# Patient Record
Sex: FEMALE | Race: WHITE | Employment: UNEMPLOYED | URBAN - METROPOLITAN AREA
[De-identification: names, ages, dates, MRNs, and addresses within clinical notes are randomized per-mention and may not be internally consistent; named-entity substitution may affect disease eponyms.]

---

## 2021-03-17 ENCOUNTER — HOSPITAL ENCOUNTER (OUTPATIENT)
Dept: LAB | Age: 66
Discharge: HOME | End: 2021-03-17
Payer: OTHER GOVERNMENT

## 2021-03-17 ENCOUNTER — OFFICE VISIT (OUTPATIENT)
Dept: HEMATOLOGY | Age: 66
End: 2021-03-17
Payer: OTHER GOVERNMENT

## 2021-03-17 VITALS
SYSTOLIC BLOOD PRESSURE: 128 MMHG | HEIGHT: 64 IN | WEIGHT: 192.13 LBS | BODY MASS INDEX: 32.8 KG/M2 | DIASTOLIC BLOOD PRESSURE: 75 MMHG | TEMPERATURE: 98.3 F | OXYGEN SATURATION: 98 % | HEART RATE: 68 BPM

## 2021-03-17 DIAGNOSIS — R74.8 ELEVATED LIVER ENZYMES: Primary | ICD-10-CM

## 2021-03-17 DIAGNOSIS — R74.8 ELEVATED LIVER ENZYMES: ICD-10-CM

## 2021-03-17 PROBLEM — I10 HYPERTENSION: Status: ACTIVE | Noted: 2021-03-17

## 2021-03-17 PROBLEM — Z98.84 HISTORY OF GASTRIC BYPASS: Status: ACTIVE | Noted: 2021-03-17

## 2021-03-17 PROBLEM — E78.00 HYPERCHOLESTEREMIA: Status: ACTIVE | Noted: 2021-03-17

## 2021-03-17 PROBLEM — C67.9 BLADDER CANCER (HCC): Status: ACTIVE | Noted: 2021-03-17

## 2021-03-17 LAB
ALBUMIN SERPL-MCNC: 3.9 G/DL (ref 3.4–5)
ALBUMIN/GLOB SERPL: 1.1 {RATIO} (ref 0.8–1.7)
ALP SERPL-CCNC: 155 U/L (ref 45–117)
ALT SERPL-CCNC: 36 U/L (ref 13–56)
ANION GAP SERPL CALC-SCNC: 4 MMOL/L (ref 3–18)
AST SERPL-CCNC: 37 U/L (ref 10–38)
BASOPHILS # BLD: 0 K/UL (ref 0–0.1)
BASOPHILS NFR BLD: 1 % (ref 0–2)
BILIRUB DIRECT SERPL-MCNC: 0.1 MG/DL (ref 0–0.2)
BILIRUB SERPL-MCNC: 0.3 MG/DL (ref 0.2–1)
BUN SERPL-MCNC: 15 MG/DL (ref 7–18)
BUN/CREAT SERPL: 19 (ref 12–20)
CALCIUM SERPL-MCNC: 9.5 MG/DL (ref 8.5–10.1)
CHLORIDE SERPL-SCNC: 107 MMOL/L (ref 100–111)
CO2 SERPL-SCNC: 29 MMOL/L (ref 21–32)
CREAT SERPL-MCNC: 0.77 MG/DL (ref 0.6–1.3)
DIFFERENTIAL METHOD BLD: ABNORMAL
EOSINOPHIL # BLD: 0.2 K/UL (ref 0–0.4)
EOSINOPHIL NFR BLD: 3 % (ref 0–5)
ERYTHROCYTE [DISTWIDTH] IN BLOOD BY AUTOMATED COUNT: 12.6 % (ref 11.6–14.5)
GLOBULIN SER CALC-MCNC: 3.4 G/DL (ref 2–4)
GLUCOSE SERPL-MCNC: 87 MG/DL (ref 74–99)
HCT VFR BLD AUTO: 36.8 % (ref 35–45)
HGB BLD-MCNC: 11.8 G/DL (ref 12–16)
LYMPHOCYTES # BLD: 1.5 K/UL (ref 0.9–3.6)
LYMPHOCYTES NFR BLD: 30 % (ref 21–52)
MCH RBC QN AUTO: 33.9 PG (ref 24–34)
MCHC RBC AUTO-ENTMCNC: 32.1 G/DL (ref 31–37)
MCV RBC AUTO: 105.7 FL (ref 74–97)
MONOCYTES # BLD: 0.4 K/UL (ref 0.05–1.2)
MONOCYTES NFR BLD: 9 % (ref 3–10)
NEUTS SEG # BLD: 2.9 K/UL (ref 1.8–8)
NEUTS SEG NFR BLD: 57 % (ref 40–73)
PLATELET # BLD AUTO: 235 K/UL (ref 135–420)
PMV BLD AUTO: 11.3 FL (ref 9.2–11.8)
POTASSIUM SERPL-SCNC: 4 MMOL/L (ref 3.5–5.5)
PROT SERPL-MCNC: 7.3 G/DL (ref 6.4–8.2)
RBC # BLD AUTO: 3.48 M/UL (ref 4.2–5.3)
SODIUM SERPL-SCNC: 140 MMOL/L (ref 136–145)
WBC # BLD AUTO: 5.1 K/UL (ref 4.6–13.2)

## 2021-03-17 PROCEDURE — 86706 HEP B SURFACE ANTIBODY: CPT

## 2021-03-17 PROCEDURE — 80076 HEPATIC FUNCTION PANEL: CPT

## 2021-03-17 PROCEDURE — 36415 COLL VENOUS BLD VENIPUNCTURE: CPT

## 2021-03-17 PROCEDURE — 99204 OFFICE O/P NEW MOD 45 MIN: CPT | Performed by: NURSE PRACTITIONER

## 2021-03-17 PROCEDURE — 86708 HEPATITIS A ANTIBODY: CPT

## 2021-03-17 PROCEDURE — 85025 COMPLETE CBC W/AUTO DIFF WBC: CPT

## 2021-03-17 PROCEDURE — 80048 BASIC METABOLIC PNL TOTAL CA: CPT

## 2021-03-17 PROCEDURE — 87340 HEPATITIS B SURFACE AG IA: CPT

## 2021-03-17 PROCEDURE — 86803 HEPATITIS C AB TEST: CPT

## 2021-03-17 PROCEDURE — 86704 HEP B CORE ANTIBODY TOTAL: CPT

## 2021-03-17 NOTE — PROGRESS NOTES
[address] [facility]      Nolan Cushing, MD, Scarlet Guerrero, Morrison Fabry, MD, MPH      Bridgett Ayon, PA-VITO Sakrar, ACNP-BC     Coleen Trejo, [facility]   Deepa Faria FNP-C    Simona Morgan, [facility]       Michelle Covarrubias On license of [facility] 136    at [address], [address] [place] [address], [facility] 22.    [phone]    FAX: [address] [place] [address], [address]    [phone]    FAX: [phone]       Patient Care Team:  Evelyn Arce DO as PCP - General (Family Medicine)  Gaurav Jerome NP as Consulting Provider (Nurse Practitioner)    Problem List  Date Reviewed: 3/17/2021          Codes Class Noted    Bladder cancer [facility]) ICD-10-CM: C67.9  ICD-9-CM: 188.9  3/17/2021        History of gastric bypass ICD-10-CM: Z98.84  ICD-9-CM: V45.86  3/17/2021        Hypertension ICD-10-CM: I10  ICD-9-CM: 401.9  3/17/2021        Hypercholesteremia ICD-10-CM: E78.00  ICD-9-CM: 272.0  3/17/2021        Elevated liver enzymes ICD-10-CM: R74.8  ICD-9-CM: 790.5  3/17/2021            The clinicians listed above have asked me to see Shell Oris in consultation regarding elevated liver enzymes and its management. All medical records sent by the referring physicians were reviewed including imaging. The patient is a 63 y.o.  female who was found to have elevated liver enzymes in 1/2021. Serologic evaluation for markers of chronic liver disease has either not been performed or the     Imaging of the liver was either not performed or the results are not available to me. An assessment of liver fibrosis with biopsy or elastography has not been performed.       The patient had not started any new medications within 3 months     The patient has the following symptoms which are thought to be due to the liver disease:  pain in the right side over the liver. The patient is not currently experiencing the following symptoms of liver disease: fatigue, yellowing of the eyes or skin, problems concentrating. The patient completes all daily activities without any functional limitations. ASSESSMENT AND PLAN:  Elevated liver enzymes    Persistent elevation in liver transaminases of unclear etiology at this time. Serologic testing for causes of chronic liver disease was ordered. Will perform additional serologic tests to screen for other causes of chronic liver disease. The most likely causes for the liver chemistry abnormalities were discussed with the patient and include alcoholic liver diease. The need to perform an assessment of liver fibrosis was discussed with the patient. The Fibroscan can assess liver fibrosis and determine if a patient has advanced fibrosis or cirrhosis without the need for liver biopsy. This will be performed at the next office visit. If the Fibroscan suggests advanced fibrosis then a liver biopsy should be considered. The Fibroscan can be repeated annually or as often as clinically indicated to assess for fibrosis progression and/or regression. If the liver enzymes remain persistently elevated without explanation over 1-2 years then a liver biopsy should be considered. Will perform laboratory testing to monitor liver function and degree of liver injury. This included   BMP, hepatic panel, CBC with platelet count, INR. Will perform imaging of the liver with ultrasound. The need to perform a liver biopsy to help determine the cause and severity of the liver test abnormalities was discussed. The risks of performing the liver biopsy including pain, puncture of the lung, gallbladder, intestine or kidney and bleeding were discussed. Will defer liver biopsy for now.         Screening for Hepatocellular Carcinoma  HCC screening is not necessary if the patient has no evidence of cirrhosis. Treatment of other medical problems in patients with chronic liver disease  There are no contraindications for the patient to take most medications that are necessary for treatment of other medical issues. The patient can take any medications utilized for treatment of DM, statins to treat hypercholesterolemia    The patient consumes alcohol on a daily basis or has recently stopped consuming alcohol. Regular alcohol use increases the risk of toxicity from acetaminophen. This analgesic should be avoided until the patient has been abstinent from alcohol for 6 months. Counseling for alcohol in patients with chronic liver disease  The patient was counseled regarding alcohol consumption and the effect of alcohol on chronic liver disease. Patients who have undergone obesity surgery are at much greater risk to develop alcoholic liver injury. The patient consumes too much alcohol and is at risk to develop alcohol induced liver injury. It was recommended that all alcohol consumption be stopped and the patient be abstinent from alcohol  for at least 3 months. Vaccinations   The need for vaccination against viral hepatitis A and B will be assessed with serologic and instituted as appropriate. Routine vaccinations against other bacterial and viral agents can be performed as indicated. Annual flu vaccination should be administered if indicated. ALLERGIES  Allergies   Allergen Reactions    Lisinopril Other (comments)     Pt states makes her feel really loopy    Niacin Other (comments)     Flushing         MEDICATIONS  Current Outpatient Medications   Medication Sig    aspirin 81 mg chewable tablet Take 81 mg by mouth daily.  atorvastatin (LIPITOR) 40 mg tablet Take 40 mg by mouth daily.  telmisartan (MICARDIS) 40 mg tablet Take 40 mg by mouth daily.     diazepam (VALIUM) 5 mg tablet Take 1 Tab by mouth every eight (8) hours as needed for Anxiety. Max Daily Amount: 15 mg. No current facility-administered medications for this visit. SYSTEM REVIEW NOT RELATED TO LIVER DISEASE OR REVIEWED ABOVE:  Constitution systems: Negative for fever, chills, weight gain, weight loss. Eyes: Negative for visual changes. ENT: Negative for sore throat, painful swallowing. Respiratory: Negative for cough, hemoptysis, SOB. Cardiology: Negative for chest pain, palpitations. GI:  Negative for constipation or diarrhea. : Negative for urinary frequency, dysuria, hematuria, nocturia. Skin: Negative for rash. Hematology: Negative for easy bruising, blood clots. Musculo-skelatal: Negative for back pain, muscle pain, weakness. Neurologic: Negative for headaches, dizziness, vertigo, memory problems not related to HE. Psychology: Negative for anxiety, depression. FAMILY HISTORY:  The father  of complications of open heart surgery. The mother Has/had the following chronic disease(s): CAD, Stroke. There is no family history of liver disease. SOCIAL HISTORY:  The patient is . The patient has 2 children, 4 step children and 15 grandchildren. The patient stopped using tobacco products in . The patient has previously consumed alcohol in excess. The patient retired in  as a nurse. PHYSICAL EXAMINATION:  Visit Vitals  /75   Pulse 68   Temp 98.3 °F (36.8 °C) (Tympanic)   Ht 5' 4\" (1.626 m)   Wt 192 lb 2 oz (87.1 kg)   SpO2 98%   BMI 32.98 kg/m²     General: No acute distress. Eyes: Sclera anicteric. ENT: No oral lesions. Thyroid normal.  Nodes: No adenopathy. Skin: No spider angiomata. No jaundice. No palmar erythema. Respiratory: Lungs clear to auscultation. Cardiovascular: Regular heart rate. No murmurs. No JVD. Abdomen: Soft non-tender. Liver size normal to percussion/palpation. Spleen not palpable. No obvious ascites. Extremities: No edema.   No muscle wasting. No gross arthritic changes. Neurologic: Alert and oriented. Cranial nerves grossly intact. No asterixis. LABORATORY STUDIES:  Recent liver function panel, CBC with platelet count and BMP are not available. These studies will be performed. SEROLOGIES:  Not available or performed. Testing was performed today. LIVER HISTOLOGY:  Not available or performed    ENDOSCOPIC PROCEDURES:  Not available or performed    RADIOLOGY:  Not available or performed    OTHER TESTING:  Not available or performed    FOLLOW-UP:  All of the issues listed above in the Assessment and Plan were discussed with the patient. All questions were answered. The patient expressed a clear understanding of the above. [address] [place] [address] in 4 weeks for Fibroscan and to review all data and determine the treatment plan.         Geronimo Mancera, AGPCNP-BC  [address] Liver [place] [address] [address]  [address] [place] [address], [address] [place] [address] Street [address]  [phone]

## 2021-03-18 LAB
HAV AB SER QL IA: NEGATIVE
HBV CORE AB SERPL QL IA: NEGATIVE
HBV SURFACE AB SER QL IA: POSITIVE
HBV SURFACE AB SERPL IA-ACNC: 13.09 MIU/ML
HBV SURFACE AG SER QL: <0.1 INDEX
HBV SURFACE AG SER QL: NEGATIVE
HCV AB S/CO SERPL IA: <0.1 S/CO RATIO (ref 0–0.9)
HCV AB SERPL QL IA: NORMAL
HEP BS AB COMMENT,HBSAC: NORMAL

## 2021-04-19 ENCOUNTER — HOSPITAL ENCOUNTER (OUTPATIENT)
Dept: LAB | Age: 66
Discharge: HOME | End: 2021-04-19
Payer: OTHER GOVERNMENT

## 2021-04-19 ENCOUNTER — OFFICE VISIT (OUTPATIENT)
Dept: HEMATOLOGY | Age: 66
End: 2021-04-19
Payer: OTHER GOVERNMENT

## 2021-04-19 VITALS
RESPIRATION RATE: 17 BRPM | OXYGEN SATURATION: 99 % | TEMPERATURE: 98 F | WEIGHT: 194 LBS | HEART RATE: 87 BPM | BODY MASS INDEX: 33.12 KG/M2 | DIASTOLIC BLOOD PRESSURE: 61 MMHG | SYSTOLIC BLOOD PRESSURE: 147 MMHG | HEIGHT: 64 IN

## 2021-04-19 DIAGNOSIS — R74.8 ELEVATED LIVER ENZYMES: ICD-10-CM

## 2021-04-19 DIAGNOSIS — R74.8 ELEVATED LIVER ENZYMES: Primary | ICD-10-CM

## 2021-04-19 PROCEDURE — 83516 IMMUNOASSAY NONANTIBODY: CPT

## 2021-04-19 PROCEDURE — 91200 LIVER ELASTOGRAPHY: CPT | Performed by: NURSE PRACTITIONER

## 2021-04-19 PROCEDURE — 36415 COLL VENOUS BLD VENIPUNCTURE: CPT

## 2021-04-19 PROCEDURE — 86038 ANTINUCLEAR ANTIBODIES: CPT

## 2021-04-19 PROCEDURE — 99214 OFFICE O/P EST MOD 30 MIN: CPT | Performed by: NURSE PRACTITIONER

## 2021-04-19 NOTE — PROGRESS NOTES
[address] [facility]      Supa Fierro MD, Rose Babcock, Roxana Rdz MD, MPH      Marianne Marion, PA-VITO Bernard, [facility]     Coleen Trejo, [facility]   Judy Silva, P-C    Reed Erickson, [facility]       Michelle Giles [facility] 136    at [address]    [address] [facility], [address] [place] [address], [facility] 22.    [phone]    FAX: [address] [place] [address], [address]    [phone]    FAX: [phone]       Patient Care Team:  Wild Wyman DO as PCP - General (Family Medicine)  Fabby Ward NP as Consulting Provider (Nurse Practitioner)    Problem List  Date Reviewed: 3/17/2021          Codes Class Noted    Bladder cancer [facility]) ICD-10-CM: C67.9  ICD-9-CM: 188.9  3/17/2021        History of gastric bypass ICD-10-CM: Z98.84  ICD-9-CM: V45.86  3/17/2021        Hypertension ICD-10-CM: I10  ICD-9-CM: 401.9  3/17/2021        Hypercholesteremia ICD-10-CM: E78.00  ICD-9-CM: 272.0  3/17/2021        Elevated liver enzymes ICD-10-CM: R74.8  ICD-9-CM: 790.5  3/17/2021            Germain Mccracken is being seen at [facility] for management of suspected fatty liver. The active problem list, all pertinent past medical history, medications, liver histology, radiologic findings, and laboratory findings related to the liver disorder were reviewed and discussed with the patient. The patient is a 61 y.o.  female who was found to have elevated liver enzymes in 1/2021. Serologic evaluation for markers of chronic liver disease have been performed. All were negative. Imaging of the liver was either not performed or the results are not available to me. Assessment of liver fibrosis with Fibroscan was performed in the office today.  The result was 4.2 kPa which correlates with no fibrosis. The CAP score of 400 suggests hepatic steatosis. The patient has the following symptoms which are thought to be due to the liver disease:  pain in the right side over the liver. The patient is not currently experiencing the following symptoms of liver disease: fatigue, yellowing of the eyes or skin, problems concentrating. The patient completes all daily activities without any functional limitations. ASSESSMENT AND PLAN:  Fatty liver    Suspect the patient has fatty liver based upon imaging, Fiboscan CAP score, features of metabolic syndrome, serologic studies that are negative for other causes of chronic liver disease. The histologic severity has not been defined. Fibroscan performed in 4/2021 suggests no fibrosis    Liver transaminases are normal. ALP is elevated. Liver function is normal. The platelet count is normal.      Based upon laboratory studies and Fibroscan the patient does not appear to have significant liver injury. Have performed laboratory testing to monitor liver function and degree of liver injury. This included   BMP, hepatic panel, CBC with platelet count. Serologic testing for causes of chronic liver disease was negative for HCV, HBV     Will perform additional serologic tests to screen for other causes of chronic liver disease. Will perform imaging of the liver with ultrasound. Only a liver biopsy can confirm if the patient does have fatty liver and differentiate NAFL from BIANCHI. The treatment is the same; weight reduction. If the liver biopsy demonstrates BIANCHI the patient could be eligible for enrollment into clinical trials for treatment of BIANCHI. There is no reason to perform liver biopsy at this time. Liver chemistries will be monitored. If the liver enzymes remain persistently elevated over the next 1-2 years a liver biopsy should be performed to ensure there is no ongoing chronic liver disease.     If the patient loses 20% of current body weight, which is 38 pounds, down to a weight of 156 pounds, all steatosis will have resolved. Once all steatosis has resolved all inflammation will resolve. Then all fibrosis will gradually resolve and the liver could eventually be normal.    There is currently no FDA approved medical treatment for fatty liver, NALFD or BIANCHI. The only medical treatments for BIANCHI are through clinical trials. The patient would be a good candidate for enrollment into a clinical trial if found to have BIANCHI. Counseling for diet and weight loss in patients with confirmed or suspected NAFLD  The patient was counseled regarding diet and exercise to achieve weight loss. The best diet for patients with fatty liver is one very low in carbohydrates and enriched with protein such as an [employer] program.      The patient was told not to consume any food products and drinks containing fructose as this enhances hepatic fat synthesis. There is no medication or vitamin supplements that we advocate for BIANCHI. Using glitazones in patients without diabetes mellitus has been shown to reduce fat content in the liver but has no effect on fibrosis and is associated with weight gain. Vitamin E has also been used but the data is not very good and most experts no longer advocate this. Screening for Hepatocellular Carcinoma  HCC screening is not necessary if the patient has no evidence of cirrhosis. Treatment of other medical problems in patients with chronic liver disease  There are no contraindications for the patient to take most medications that are necessary for treatment of other medical issues. The patient can take any medications utilized for treatment of DM, statins to treat hypercholesterolemia    The patient consumes alcohol on a daily basis or has recently stopped consuming alcohol. Regular alcohol use increases the risk of toxicity from acetaminophen.   This analgesic should be avoided until the patient has been abstinent from alcohol for 6 months. Counseling for alcohol in patients with chronic liver disease  The patient was counseled regarding alcohol consumption and the effect of alcohol on chronic liver disease. Patients who have undergone obesity surgery are at much greater risk to develop alcoholic liver injury. The patient consumes too much alcohol and is at risk to develop alcohol induced liver injury. It was recommended that all alcohol consumption be stopped and the patient be abstinent from alcohol  for at least 3 months. Vaccinations   Vaccination for viral hepatitis A is recommended since the patient has no serologic evidence of previous exposure or vaccination with immunity. Vaccination for viral hepatitis B is not needed. The patient has serologic evidence of prior exposure or vaccination with immunity. Routine vaccinations against other bacterial and viral agents can be performed as indicated. Annual flu vaccination should be administered if indicated. ALLERGIES  Allergies   Allergen Reactions    Lisinopril Other (comments)     Pt states makes her feel really loopy    Niacin Other (comments)     Flushing         MEDICATIONS  Current Outpatient Medications   Medication Sig    aspirin 81 mg chewable tablet Take 81 mg by mouth daily.  atorvastatin (LIPITOR) 40 mg tablet Take 40 mg by mouth daily.  telmisartan (MICARDIS) 40 mg tablet Take 40 mg by mouth daily.  diazepam (VALIUM) 5 mg tablet Take 1 Tab by mouth every eight (8) hours as needed for Anxiety. Max Daily Amount: 15 mg. No current facility-administered medications for this visit. SYSTEM REVIEW NOT RELATED TO LIVER DISEASE OR REVIEWED ABOVE:  Constitution systems: Negative for fever, chills, weight gain, weight loss. Eyes: Negative for visual changes. ENT: Negative for sore throat, painful swallowing. Respiratory: Negative for cough, hemoptysis, SOB.    Cardiology: Negative for chest pain, palpitations. GI:  Negative for constipation or diarrhea. : Negative for urinary frequency, dysuria, hematuria, nocturia. Skin: Negative for rash. Hematology: Negative for easy bruising, blood clots. Musculo-skelatal: Negative for back pain, muscle pain, weakness. Neurologic: Negative for headaches, dizziness, vertigo, memory problems not related to HE. Psychology: Negative for anxiety, depression. FAMILY HISTORY:  The father  of complications of open heart surgery. The mother Has/had the following chronic disease(s): CAD, Stroke. There is no family history of liver disease. SOCIAL HISTORY:  The patient is . The patient has 2 children, 4 step children and 15 grandchildren. The patient stopped using tobacco products in . The patient has previously consumed alcohol in excess. The patient retired in  as a nurse. PHYSICAL EXAMINATION:  Visit Vitals  BP (!) 147/61   Pulse 87   Temp 98 °F (36.7 °C)   Resp 17   Ht 5' 4\" (1.626 m)   Wt 194 lb (88 kg)   SpO2 99%   BMI 33.30 kg/m²     General: No acute distress. Eyes: Sclera anicteric. ENT: No oral lesions. Thyroid normal.  Nodes: No adenopathy. Skin: No spider angiomata. No jaundice. No palmar erythema. Respiratory: Lungs clear to auscultation. Cardiovascular: Regular heart rate. No murmurs. No JVD. Abdomen: Soft non-tender. Liver size normal to percussion/palpation. Spleen not palpable. No obvious ascites. Extremities: No edema. No muscle wasting. No gross arthritic changes. Neurologic: Alert and oriented. Cranial nerves grossly intact. No asterixis.       LABORATORY STUDIES:  Liver [place] of [address] Units 3/17/2021   WBC 4.6 - 13.2 K/uL 5.1   ANC 1.8 - 8.0 K/UL 2.9   HGB 12.0 - 16.0 g/dL 11.8 (L)    - 420 K/uL 235   AST 10 - 38 U/L 37   ALT 13 - 56 U/L 36   Alk Phos 45 - 117 U/L 155 (H)   Bili, Total 0.2 - 1.0 MG/DL 0.3   Bili, Direct 0.0 - 0.2 MG/DL 0.1   Albumin 3.4 - 5.0 g/dL 3.9   BUN 7.0 - 18 MG/DL 15   Creat 0.6 - 1.3 MG/DL 0.77   Na 136 - 145 mmol/L 140   K 3.5 - 5.5 mmol/L 4.0   Cl 100 - 111 mmol/L 107   CO2 21 - 32 mmol/L 29   Glucose 74 - 99 mg/dL 87       SEROLOGIES:  Serologies Latest Ref Rng & Units 3/17/2021   Hep A Ab, Total Negative   Negative   Hep B Surface Ag <1.00 Index <0.10   Hep B Surface Ag Interp NEG   Negative   Hep B Core Ab, Total Negative   Negative   Hep B Surface Ab >10.0 mIU/mL 13.09   Hep B Surface Ab Interp POS   Positive   Hep C Ab 0.0 - 0.9 s/co ratio <0.1       LIVER HISTOLOGY:  4/2021. [facility]. EkPa was 4.2. IQR/med 22%. . The results suggested a fibrosis level of F0. The CAP score suggests there is hepatic steatosis. ENDOSCOPIC PROCEDURES:  Not available or performed    RADIOLOGY:  Not available or performed    OTHER TESTING:  Not available or performed    FOLLOW-UP:  All of the issues listed above in the Assessment and Plan were discussed with the patient. All questions were answered. The patient expressed a clear understanding of the above. [facility] 87 in 6 months to assess for the effects of diet changes and weight loss.       Rachael Vences, AGPCNP-BC  [address] Liver [place] of [address], [facility] [address]  [facility], [address] [place] [address] Street [address]  [phone]

## 2021-04-20 LAB — ANA TITR SER IF: NEGATIVE {TITER}

## 2021-04-21 LAB
ACTIN IGG SERPL-ACNC: 13 UNITS (ref 0–19)
MITOCHONDRIA M2 IGG SER-ACNC: <20 UNITS (ref 0–20)

## 2021-10-19 ENCOUNTER — OFFICE VISIT (OUTPATIENT)
Dept: HEMATOLOGY | Age: 66
End: 2021-10-19
Payer: OTHER GOVERNMENT

## 2021-10-19 ENCOUNTER — HOSPITAL ENCOUNTER (OUTPATIENT)
Dept: LAB | Age: 66
Discharge: HOME | End: 2021-10-19
Payer: OTHER GOVERNMENT

## 2021-10-19 VITALS
BODY MASS INDEX: 34.15 KG/M2 | DIASTOLIC BLOOD PRESSURE: 71 MMHG | RESPIRATION RATE: 19 BRPM | HEART RATE: 76 BPM | SYSTOLIC BLOOD PRESSURE: 143 MMHG | HEIGHT: 64 IN | WEIGHT: 200 LBS | OXYGEN SATURATION: 98 % | TEMPERATURE: 98.3 F

## 2021-10-19 DIAGNOSIS — R74.8 ELEVATED LIVER ENZYMES: ICD-10-CM

## 2021-10-19 DIAGNOSIS — R74.8 ELEVATED LIVER ENZYMES: Primary | ICD-10-CM

## 2021-10-19 LAB
ALBUMIN SERPL-MCNC: 3.5 G/DL (ref 3.4–5)
ALBUMIN/GLOB SERPL: 0.9 {RATIO} (ref 0.8–1.7)
ALP SERPL-CCNC: 241 U/L (ref 45–117)
ALT SERPL-CCNC: 84 U/L (ref 13–56)
ANION GAP SERPL CALC-SCNC: 4 MMOL/L (ref 3–18)
AST SERPL-CCNC: 99 U/L (ref 10–38)
BASOPHILS # BLD: 0.1 K/UL (ref 0–0.1)
BASOPHILS NFR BLD: 1 % (ref 0–2)
BILIRUB DIRECT SERPL-MCNC: 0.1 MG/DL (ref 0–0.2)
BILIRUB SERPL-MCNC: 0.4 MG/DL (ref 0.2–1)
BUN SERPL-MCNC: 22 MG/DL (ref 7–18)
BUN/CREAT SERPL: 23 (ref 12–20)
CALCIUM SERPL-MCNC: 8.8 MG/DL (ref 8.5–10.1)
CHLORIDE SERPL-SCNC: 105 MMOL/L (ref 100–111)
CO2 SERPL-SCNC: 29 MMOL/L (ref 21–32)
CREAT SERPL-MCNC: 0.96 MG/DL (ref 0.6–1.3)
DIFFERENTIAL METHOD BLD: ABNORMAL
EOSINOPHIL # BLD: 0.3 K/UL (ref 0–0.4)
EOSINOPHIL NFR BLD: 6 % (ref 0–5)
ERYTHROCYTE [DISTWIDTH] IN BLOOD BY AUTOMATED COUNT: 12.4 % (ref 11.6–14.5)
GLOBULIN SER CALC-MCNC: 3.8 G/DL (ref 2–4)
GLUCOSE SERPL-MCNC: 83 MG/DL (ref 74–99)
HCT VFR BLD AUTO: 36.4 % (ref 35–45)
HGB BLD-MCNC: 11.7 G/DL (ref 12–16)
LYMPHOCYTES # BLD: 1.3 K/UL (ref 0.9–3.6)
LYMPHOCYTES NFR BLD: 24 % (ref 21–52)
MCH RBC QN AUTO: 33.5 PG (ref 24–34)
MCHC RBC AUTO-ENTMCNC: 32.1 G/DL (ref 31–37)
MCV RBC AUTO: 104.3 FL (ref 78–100)
MONOCYTES # BLD: 0.4 K/UL (ref 0.05–1.2)
MONOCYTES NFR BLD: 8 % (ref 3–10)
NEUTS SEG # BLD: 3.3 K/UL (ref 1.8–8)
NEUTS SEG NFR BLD: 62 % (ref 40–73)
PLATELET # BLD AUTO: 217 K/UL (ref 135–420)
PMV BLD AUTO: 10.6 FL (ref 9.2–11.8)
POTASSIUM SERPL-SCNC: 4.9 MMOL/L (ref 3.5–5.5)
PROT SERPL-MCNC: 7.3 G/DL (ref 6.4–8.2)
RBC # BLD AUTO: 3.49 M/UL (ref 4.2–5.3)
SODIUM SERPL-SCNC: 138 MMOL/L (ref 136–145)
WBC # BLD AUTO: 5.3 K/UL (ref 4.6–13.2)

## 2021-10-19 PROCEDURE — 36415 COLL VENOUS BLD VENIPUNCTURE: CPT

## 2021-10-19 PROCEDURE — 85025 COMPLETE CBC W/AUTO DIFF WBC: CPT

## 2021-10-19 PROCEDURE — 99213 OFFICE O/P EST LOW 20 MIN: CPT | Performed by: NURSE PRACTITIONER

## 2021-10-19 PROCEDURE — 80076 HEPATIC FUNCTION PANEL: CPT

## 2021-10-19 PROCEDURE — 80048 BASIC METABOLIC PNL TOTAL CA: CPT

## 2021-10-19 NOTE — PROGRESS NOTES
Liver enzymes are increased. Liver function is normal. All other labs were either normal and/or abnormal values were not clinically meaningful to patient's current visit.

## 2021-10-19 NOTE — PROGRESS NOTES
[address] [facility]      Nery Alegre MD, Vannessa Trujillo MD, MPH      Chad Renae, MAI Yan, ACNP-BC     Coleen Trejo [facility]   FLORENCIA Fischer, [facility]       Michellechantal [facility]    at [address]    217 [facility], [address] [place] [address], [facility] 22.    [phone]    FAX: [address] [place] [address] [address], [address]    [phone]    FAX: [phone]       Patient Care Team:  Sandra Ramos DO as PCP - General (Family Medicine)  Gary Osorio NP as Consulting Provider (Nurse Practitioner)    Problem List  Date Reviewed: 3/17/2021        Codes Class Noted    Bladder cancer [facility]) ICD-10-CM: C67.9  ICD-9-CM: 188.9  3/17/2021        History of gastric bypass ICD-10-CM: Z98.84  ICD-9-CM: V45.86  3/17/2021        Hypertension ICD-10-CM: I10  ICD-9-CM: 401.9  3/17/2021        Hypercholesteremia ICD-10-CM: E78.00  ICD-9-CM: 272.0  3/17/2021        Elevated liver enzymes ICD-10-CM: R74.8  ICD-9-CM: 790.5  3/17/2021            Carmen Rowell is being seen at [address] for management of suspected fatty liver. The active problem list, all pertinent past medical history, medications, liver histology, radiologic findings, and laboratory findings related to the liver disorder were reviewed and discussed with the patient. The patient is a 61 y.o.  female who was found to have elevated liver enzymes in 1/2021. Serologic evaluation for markers of chronic liver disease have been performed. All were negative. Imaging of the liver was either not performed or the results are not available to me. Assessment of liver fibrosis with Fibroscan was performed in 4/2021.  The result was 4.2 kPa which correlates with no fibrosis. The CAP score of 400 suggests hepatic steatosis. The patient has the following symptoms which are thought to be due to the liver disease:  pain in the right side over the liver. The patient is not currently experiencing the following symptoms of liver disease: fatigue, yellowing of the eyes or skin, problems concentrating. The patient completes all daily activities without any functional limitations. ASSESSMENT AND PLAN:  Fatty liver  Suspect the patient has fatty liver based upon imaging, Fiboscan CAP score, features of metabolic syndrome, serologic studies that are negative for other causes of chronic liver disease. The histologic severity has not been defined. Fibroscan performed in 4/2021 suggests no fibrosis    Liver transaminases are elevated. ALP is elevated. Liver function is normal. The platelet count is normal.      Based upon laboratory studies and Fibroscan the patient does not appear to have significant liver injury. Have performed laboratory testing to monitor liver function and degree of liver injury. This included   BMP, hepatic panel, CBC with platelet count. Serologic testing for causes of chronic liver disease was negative for HCV, HBV     Will perform additional serologic tests to screen for other causes of chronic liver disease. Will perform imaging of the liver with ultrasound. Only a liver biopsy can confirm if the patient does have fatty liver and differentiate NAFL from BIANCHI. The treatment is the same; weight reduction. If the liver biopsy demonstrates BIANCHI the patient could be eligible for enrollment into clinical trials for treatment of BIANCHI. There is no reason to perform liver biopsy at this time. Liver chemistries will be monitored. If the liver enzymes remain persistently elevated over the next 1-2 years a liver biopsy should be performed to ensure there is no ongoing chronic liver disease.     If the patient loses 20% of current body weight, which is 38 pounds, down to a weight of 156 pounds, all steatosis will have resolved. Once all steatosis has resolved all inflammation will resolve. Then all fibrosis will gradually resolve and the liver could eventually be normal.    There is currently no FDA approved medical treatment for fatty liver, NALFD or BIANCHI. The only medical treatments for BIANCHI are through clinical trials. The patient would be a good candidate for enrollment into a clinical trial if found to have BIANCHI. Counseling for diet and weight loss in patients with confirmed or suspected NAFLD  The patient was counseled regarding diet and exercise to achieve weight loss. The best diet for patients with fatty liver is one very low in carbohydrates and enriched with protein such as an Humble's program.      The patient was told not to consume any food products and drinks containing fructose as this enhances hepatic fat synthesis. There is no medication or vitamin supplements that we advocate for BIANCHI. Using glitazones in patients without diabetes mellitus has been shown to reduce fat content in the liver but has no effect on fibrosis and is associated with weight gain. Vitamin E has also been used but the data is not very good and most experts no longer advocate this. Screening for Hepatocellular Carcinoma  HCC screening is not necessary if the patient has no evidence of cirrhosis. Treatment of other medical problems in patients with chronic liver disease  There are no contraindications for the patient to take most medications that are necessary for treatment of other medical issues. The patient can take any medications utilized for treatment of DM, statins to treat hypercholesterolemia    The patient consumes alcohol on a daily basis or has recently stopped consuming alcohol. Regular alcohol use increases the risk of toxicity from acetaminophen.   This analgesic should be avoided until the patient has been abstinent from alcohol for 6 months. Counseling for alcohol in patients with chronic liver disease  The patient was counseled regarding alcohol consumption and the effect of alcohol on chronic liver disease. Patients who have undergone obesity surgery are at much greater risk to develop alcoholic liver injury. The patient consumes too much alcohol and is at risk to develop alcohol induced liver injury. It was recommended that all alcohol consumption be stopped and the patient be abstinent from alcohol  for at least 3 months. She states she has continued to consume alcohol. Vaccinations   Vaccination for viral hepatitis A is recommended since the patient has no serologic evidence of previous exposure or vaccination with immunity. Vaccination for viral hepatitis B is not needed. The patient has serologic evidence of prior exposure or vaccination with immunity. Routine vaccinations against other bacterial and viral agents can be performed as indicated. Annual flu vaccination should be administered if indicated. ALLERGIES  Allergies   Allergen Reactions    Lisinopril Other (comments)     Pt states makes her feel really loopy    Niacin Other (comments)     Flushing         MEDICATIONS  Current Outpatient Medications   Medication Sig    aspirin 81 mg chewable tablet Take 81 mg by mouth daily.  atorvastatin (LIPITOR) 40 mg tablet Take 40 mg by mouth daily.  telmisartan (MICARDIS) 40 mg tablet Take 40 mg by mouth daily.  diazepam (VALIUM) 5 mg tablet Take 1 Tab by mouth every eight (8) hours as needed for Anxiety. Max Daily Amount: 15 mg. No current facility-administered medications for this visit. SYSTEM REVIEW NOT RELATED TO LIVER DISEASE OR REVIEWED ABOVE:  Constitution systems: Negative for fever, chills, weight gain, weight loss. Eyes: Negative for visual changes. ENT: Negative for sore throat, painful swallowing.    Respiratory: Negative for cough, hemoptysis, SOB. Cardiology: Negative for chest pain, palpitations. GI:  Negative for constipation or diarrhea. : Negative for urinary frequency, dysuria, hematuria, nocturia. Skin: Negative for rash. Hematology: Negative for easy bruising, blood clots. Musculo-skelatal: Negative for back pain, muscle pain, weakness. Neurologic: Negative for headaches, dizziness, vertigo, memory problems not related to HE. Psychology: Negative for anxiety, depression. FAMILY HISTORY:  The father  of complications of open heart surgery. The mother Has/had the following chronic disease(s): CAD, Stroke. There is no family history of liver disease. SOCIAL HISTORY:  The patient is . The patient has 2 children, 4 step children and 15 grandchildren. The patient stopped using tobacco products in . The patient has previously consumed alcohol in excess. The patient retired in  as a nurse. PHYSICAL EXAMINATION:  Visit Vitals  BP (!) 143/71 (BP 1 Location: Right arm, BP Patient Position: Sitting, BP Cuff Size: Large adult)   Pulse 76   Temp 98.3 °F (36.8 °C)   Resp 19   Ht 5' 4\" (1.626 m)   Wt 200 lb (90.7 kg)   SpO2 98%   BMI 34.33 kg/m²     General: No acute distress. Eyes: Sclera anicteric. ENT: No oral lesions. Thyroid normal.  Nodes: No adenopathy. Skin: No spider angiomata. No jaundice. No palmar erythema. Respiratory: Lungs clear to auscultation. Cardiovascular: Regular heart rate. No murmurs. No JVD. Abdomen: Soft non-tender. Liver size normal to percussion/palpation. Spleen not palpable. No obvious ascites. Extremities: No edema. No muscle wasting. No gross arthritic changes. Neurologic: Alert and oriented. Cranial nerves grossly intact. No asterixis.       LABORATORY STUDIES:  Liver [place] of [address] & Units 10/19/2021 3/17/2021   WBC 4.6 - 13.2 K/uL 5.3 5.1   ANC 1.8 - 8.0 K/UL 3.3 2.9   HGB 12.0 - 16.0 g/dL 11.7 (L) 11.8 (L)    - 420 K/uL 217 235   AST 10 - 38 U/L 99 (H) 37   ALT 13 - 56 U/L 84 (H) 36   Alk Phos 45 - 117 U/L 241 (H) 155 (H)   Bili, Total 0.2 - 1.0 MG/DL 0.4 0.3   Bili, Direct 0.0 - 0.2 MG/DL 0.1 0.1   Albumin 3.4 - 5.0 g/dL 3.5 3.9   BUN 7.0 - 18 MG/DL 22 (H) 15   Creat 0.6 - 1.3 MG/DL 0.96 0.77   Na 136 - 145 mmol/L 138 140   K 3.5 - 5.5 mmol/L 4.9 4.0   Cl 100 - 111 mmol/L 105 107   CO2 21 - 32 mmol/L 29 29   Glucose 74 - 99 mg/dL 83 87       SEROLOGIES:  Serologies Latest Ref Rng & Units 4/19/2021 3/17/2021   Hep A Ab, Total Negative    Negative   Hep B Surface Ag <1.00 Index  <0.10   Hep B Surface Ag Interp NEG    Negative   Hep B Core Ab, Total Negative    Negative   Hep B Surface Ab >10.0 mIU/mL  13.09   Hep B Surface Ab Interp POS    Positive   Hep C Ab 0.0 - 0.9 s/co ratio  <0.1   ANOOP, IFA  Negative    ASMCA 0 - 19 Units 13    M2 Ab 0.0 - 20.0 Units <20.0        LIVER HISTOLOGY:  4/2021. FibroScan performed at [address]. EkPa was 4.2. IQR/med 22%. . The results suggested a fibrosis level of F0. The CAP score suggests there is hepatic steatosis. ENDOSCOPIC PROCEDURES:  Not available or performed    RADIOLOGY:  Not available or performed    OTHER TESTING:  Not available or performed    FOLLOW-UP:  All of the issues listed above in the Assessment and Plan were discussed with the patient. All questions were answered. The patient expressed a clear understanding of the above. [facility] 87 in 6 months to assess for Fibroscan.        Luwanna Closs, AGPCNP-BC  [address] [place] [address], [facility] [address]  [place], [address] [place] [address] Street - [address]  [phone]

## 2021-10-20 PROBLEM — K76.0 FATTY LIVER: Status: ACTIVE | Noted: 2021-10-20

## 2022-03-18 PROBLEM — Z98.84 HISTORY OF GASTRIC BYPASS: Status: ACTIVE | Noted: 2021-03-17

## 2022-03-19 PROBLEM — R74.8 ELEVATED LIVER ENZYMES: Status: ACTIVE | Noted: 2021-03-17

## 2022-03-19 PROBLEM — I10 HYPERTENSION: Status: ACTIVE | Noted: 2021-03-17

## 2022-03-19 PROBLEM — C67.9 BLADDER CANCER (HCC): Status: ACTIVE | Noted: 2021-03-17

## 2022-03-19 PROBLEM — K76.0 FATTY LIVER: Status: ACTIVE | Noted: 2021-10-20

## 2022-03-19 PROBLEM — E78.00 HYPERCHOLESTEREMIA: Status: ACTIVE | Noted: 2021-03-17

## 2023-01-30 ENCOUNTER — OFFICE VISIT (OUTPATIENT)
Dept: HEMATOLOGY | Age: 68
End: 2023-01-30
Payer: MEDICARE

## 2023-01-30 ENCOUNTER — HOSPITAL ENCOUNTER (OUTPATIENT)
Dept: LAB | Age: 68
Discharge: HOME | End: 2023-01-30
Payer: MEDICARE

## 2023-01-30 VITALS
TEMPERATURE: 97.5 F | SYSTOLIC BLOOD PRESSURE: 132 MMHG | BODY MASS INDEX: 35.17 KG/M2 | HEIGHT: 64 IN | HEART RATE: 80 BPM | DIASTOLIC BLOOD PRESSURE: 85 MMHG | OXYGEN SATURATION: 98 % | WEIGHT: 206 LBS

## 2023-01-30 DIAGNOSIS — K76.0 FATTY LIVER: ICD-10-CM

## 2023-01-30 DIAGNOSIS — K76.0 FATTY LIVER: Primary | ICD-10-CM

## 2023-01-30 LAB
ALBUMIN SERPL-MCNC: 3.8 G/DL (ref 3.4–5)
ALBUMIN/GLOB SERPL: 1.2 (ref 0.8–1.7)
ALP SERPL-CCNC: 282 U/L (ref 45–117)
ALT SERPL-CCNC: 76 U/L (ref 13–56)
ANION GAP SERPL CALC-SCNC: 1 MMOL/L (ref 3–18)
AST SERPL-CCNC: 75 U/L (ref 10–38)
BASOPHILS # BLD: 0 K/UL (ref 0–0.1)
BASOPHILS NFR BLD: 1 % (ref 0–2)
BILIRUB DIRECT SERPL-MCNC: 0.1 MG/DL (ref 0–0.2)
BILIRUB SERPL-MCNC: 0.4 MG/DL (ref 0.2–1)
BUN SERPL-MCNC: 25 MG/DL (ref 7–18)
BUN/CREAT SERPL: 27 (ref 12–20)
CALCIUM SERPL-MCNC: 9.2 MG/DL (ref 8.5–10.1)
CHLORIDE SERPL-SCNC: 106 MMOL/L (ref 100–111)
CO2 SERPL-SCNC: 32 MMOL/L (ref 21–32)
CREAT SERPL-MCNC: 0.94 MG/DL (ref 0.6–1.3)
DIFFERENTIAL METHOD BLD: ABNORMAL
EOSINOPHIL # BLD: 0.1 K/UL (ref 0–0.4)
EOSINOPHIL NFR BLD: 1 % (ref 0–5)
ERYTHROCYTE [DISTWIDTH] IN BLOOD BY AUTOMATED COUNT: 13.6 % (ref 11.6–14.5)
GLOBULIN SER CALC-MCNC: 3.3 G/DL (ref 2–4)
GLUCOSE SERPL-MCNC: 141 MG/DL (ref 74–99)
HCT VFR BLD AUTO: 34.1 % (ref 35–45)
HGB BLD-MCNC: 11.3 G/DL (ref 12–16)
IMM GRANULOCYTES # BLD AUTO: 0 K/UL (ref 0–0.04)
IMM GRANULOCYTES NFR BLD AUTO: 0 % (ref 0–0.5)
INR PPP: 1 (ref 0.8–1.2)
LYMPHOCYTES # BLD: 1.5 K/UL (ref 0.9–3.6)
LYMPHOCYTES NFR BLD: 37 % (ref 21–52)
MCH RBC QN AUTO: 35.2 PG (ref 24–34)
MCHC RBC AUTO-ENTMCNC: 33.1 G/DL (ref 31–37)
MCV RBC AUTO: 106.2 FL (ref 78–100)
MONOCYTES # BLD: 0.3 K/UL (ref 0.05–1.2)
MONOCYTES NFR BLD: 8 % (ref 3–10)
NEUTS SEG # BLD: 2.2 K/UL (ref 1.8–8)
NEUTS SEG NFR BLD: 53 % (ref 40–73)
NRBC # BLD: 0 K/UL (ref 0–0.01)
NRBC BLD-RTO: 0 PER 100 WBC
PLATELET # BLD AUTO: 198 K/UL (ref 135–420)
PMV BLD AUTO: 11.2 FL (ref 9.2–11.8)
POTASSIUM SERPL-SCNC: 4 MMOL/L (ref 3.5–5.5)
PROT SERPL-MCNC: 7.1 G/DL (ref 6.4–8.2)
PROTHROMBIN TIME: 13.2 SEC (ref 11.5–15.2)
RBC # BLD AUTO: 3.21 M/UL (ref 4.2–5.3)
SODIUM SERPL-SCNC: 139 MMOL/L (ref 136–145)
WBC # BLD AUTO: 4.1 K/UL (ref 4.6–13.2)

## 2023-01-30 PROCEDURE — G8432 DEP SCR NOT DOC, RNG: HCPCS | Performed by: NURSE PRACTITIONER

## 2023-01-30 PROCEDURE — 3078F DIAST BP <80 MM HG: CPT | Performed by: NURSE PRACTITIONER

## 2023-01-30 PROCEDURE — G8417 CALC BMI ABV UP PARAM F/U: HCPCS | Performed by: NURSE PRACTITIONER

## 2023-01-30 PROCEDURE — 1123F ACP DISCUSS/DSCN MKR DOCD: CPT | Performed by: NURSE PRACTITIONER

## 2023-01-30 PROCEDURE — G8400 PT W/DXA NO RESULTS DOC: HCPCS | Performed by: NURSE PRACTITIONER

## 2023-01-30 PROCEDURE — 85610 PROTHROMBIN TIME: CPT

## 2023-01-30 PROCEDURE — 3017F COLORECTAL CA SCREEN DOC REV: CPT | Performed by: NURSE PRACTITIONER

## 2023-01-30 PROCEDURE — G8427 DOCREV CUR MEDS BY ELIG CLIN: HCPCS | Performed by: NURSE PRACTITIONER

## 2023-01-30 PROCEDURE — 3074F SYST BP LT 130 MM HG: CPT | Performed by: NURSE PRACTITIONER

## 2023-01-30 PROCEDURE — 80076 HEPATIC FUNCTION PANEL: CPT

## 2023-01-30 PROCEDURE — 36415 COLL VENOUS BLD VENIPUNCTURE: CPT

## 2023-01-30 PROCEDURE — 1101F PT FALLS ASSESS-DOCD LE1/YR: CPT | Performed by: NURSE PRACTITIONER

## 2023-01-30 PROCEDURE — 80048 BASIC METABOLIC PNL TOTAL CA: CPT

## 2023-01-30 PROCEDURE — 85025 COMPLETE CBC W/AUTO DIFF WBC: CPT

## 2023-01-30 PROCEDURE — 1090F PRES/ABSN URINE INCON ASSESS: CPT | Performed by: NURSE PRACTITIONER

## 2023-01-30 PROCEDURE — 99213 OFFICE O/P EST LOW 20 MIN: CPT | Performed by: NURSE PRACTITIONER

## 2023-01-30 PROCEDURE — G0463 HOSPITAL OUTPT CLINIC VISIT: HCPCS | Performed by: NURSE PRACTITIONER

## 2023-01-30 PROCEDURE — G8536 NO DOC ELDER MAL SCRN: HCPCS | Performed by: NURSE PRACTITIONER

## 2023-01-30 NOTE — PROGRESS NOTES
[facility], Marialuisa MARSHALL, Emile Patel [place], [place]      MAI Sprague, AGPCNP-BC   Karen Birch, [facility]-AG   Christie Perdomo, FLORENCIA Ramos Se, FNPMARIANN Mott, AGPCNP-BC      [address]   at [address], [facility] Bob Kaur  22.   [phone]   FAX: [phone]  Liver [place] [facility]   at [address], [address]   [phone]   FAX: [phone]     Patient Care Team:  Laura Centeno DO as PCP - General (Family Medicine)  Marlena Low NP as Consulting Provider (Nurse Practitioner)    Problem List  Date Reviewed: 10/20/2021            Codes Class Noted    Fatty liver ICD-10-CM: K76.0  ICD-9-CM: 571.8  10/20/2021        Bladder cancer ([facility] [address] ICD-10-CM: C67.9  ICD-9-CM: 188.9  3/17/2021        History of gastric bypass ICD-10-CM: Z98.84  ICD-9-CM: V45.86  3/17/2021        Hypertension ICD-10-CM: I10  ICD-9-CM: 401.9  3/17/2021        Hypercholesteremia ICD-10-CM: E78.00  ICD-9-CM: 272.0  3/17/2021        Elevated liver enzymes ICD-10-CM: R74.8  ICD-9-CM: 790.5  3/17/2021         [facility] Chandler [facility] for management of suspected fatty liver. The active problem list, all pertinent past medical history, medications, liver histology, radiologic findings, and laboratory findings related to the liver disorder were reviewed and discussed with the patient. The patient is a 74 y.o.  female who was found to have elevated liver enzymes in 1/2021. Serologic evaluation for markers of chronic liver disease have been performed. All were negative. Imaging of the liver was either not performed or the results are not available to me. Assessment of liver fibrosis with Fibroscan was performed in 4/2021.  The result was 4.2 kPa which correlates with no fibrosis. The CAP score of 400 suggests hepatic steatosis. The patient has the following symptoms which are thought to be due to the liver disease:  pain in the right side over the liver. The patient is not currently experiencing the following symptoms of liver disease: fatigue, yellowing of the eyes or skin, problems concentrating. The patient completes all daily activities without any functional limitations. ASSESSMENT AND PLAN:  Fatty liver  Suspect the patient has fatty liver based upon imaging, Fiboscan CAP score, features of metabolic syndrome, serologic studies that are negative for other causes of chronic liver disease. The histologic severity has not been defined. Fibroscan performed in 4/2021 suggests no fibrosis    Liver transaminases are elevated. ALP is elevated. Liver function is normal. The platelet count is normal.      Based upon laboratory studies and Fibroscan the patient does not appear to have significant liver injury. Have performed laboratory testing to monitor liver function and degree of liver injury. This included   BMP, hepatic panel, CBC with platelet count. Serologic testing for causes of chronic liver disease was negative for HCV, HBV     Will perform additional serologic tests to screen for other causes of chronic liver disease. Will perform imaging of the liver with ultrasound. Only a liver biopsy can confirm if the patient does have fatty liver and differentiate NAFL from BIANCHI. The treatment is the same; weight reduction. If the liver biopsy demonstrates BIANCHI the patient could be eligible for enrollment into clinical trials for treatment of BIANCHI. There is no reason to perform liver biopsy at this time. Liver chemistries will be monitored.       If the liver enzymes remain persistently elevated over the next 1-2 years a liver biopsy should be performed to ensure there is no ongoing chronic liver disease. If the patient loses 20% of current body weight, which is 38 pounds, down to a weight of 156 pounds, all steatosis will have resolved. Once all steatosis has resolved all inflammation will resolve. Then all fibrosis will gradually resolve and the liver could eventually be normal.    There is currently no FDA approved medical treatment for fatty liver, NALFD or BIANCHI. The only medical treatments for BIANCHI are through clinical trials. The patient would be a good candidate for enrollment into a clinical trial if found to have BIANCHI. Counseling for diet and weight loss in patients with confirmed or suspected NAFLD  The patient was counseled regarding diet and exercise to achieve weight loss. The best diet for patients with fatty liver is one very low in carbohydrates and enriched with protein such as an [employer]ble's program.      The patient was told not to consume any food products and drinks containing fructose as this enhances hepatic fat synthesis. There is no medication or vitamin supplements that we advocate for BIANCHI. Using glitazones in patients without diabetes mellitus has been shown to reduce fat content in the liver but has no effect on fibrosis and is associated with weight gain. Vitamin E has also been used but the data is not very good and most experts no longer advocate this. Screening for Hepatocellular Carcinoma  HCC screening is not necessary if the patient has no evidence of cirrhosis. Treatment of other medical problems in patients with chronic liver disease  There are no contraindications for the patient to take most medications that are necessary for treatment of other medical issues. The patient can take any medications utilized for treatment of DM, statins to treat hypercholesterolemia    The patient consumes alcohol on a daily basis or has recently stopped consuming alcohol. Regular alcohol use increases the risk of toxicity from acetaminophen.   This analgesic should be avoided until the patient has been abstinent from alcohol for 6 months. Counseling for alcohol in patients with chronic liver disease  The patient was counseled regarding alcohol consumption and the effect of alcohol on chronic liver disease. Patients who have undergone obesity surgery are at much greater risk to develop alcoholic liver injury. The patient consumes too much alcohol and is at risk to develop alcohol induced liver injury. It was recommended that all alcohol consumption be stopped and the patient be abstinent from alcohol  for at least 3 months. She states she has continued to consume alcohol. Vaccinations   Vaccination for viral hepatitis A is recommended since the patient has no serologic evidence of previous exposure or vaccination with immunity. Vaccination for viral hepatitis B is not needed. The patient has serologic evidence of prior exposure or vaccination with immunity. Routine vaccinations against other bacterial and viral agents can be performed as indicated. Annual flu vaccination should be administered if indicated. ALLERGIES  Allergies   Allergen Reactions    Lisinopril Other (comments)     Pt states makes her feel really loopy    Niacin Other (comments)     Flushing         MEDICATIONS  Current Outpatient Medications   Medication Sig    aspirin 81 mg chewable tablet Take 81 mg by mouth daily. atorvastatin (LIPITOR) 40 mg tablet Take 40 mg by mouth daily. telmisartan (MICARDIS) 40 mg tablet Take 40 mg by mouth daily. diazepam (VALIUM) 5 mg tablet Take 1 Tab by mouth every eight (8) hours as needed for Anxiety. Max Daily Amount: 15 mg. No current facility-administered medications for this visit. SYSTEM REVIEW NOT RELATED TO LIVER DISEASE OR REVIEWED ABOVE:  Constitution systems: Negative for fever, chills, weight gain, weight loss. Eyes: Negative for visual changes. ENT: Negative for sore throat, painful swallowing. Respiratory: Negative for cough, hemoptysis, SOB. Cardiology: Negative for chest pain, palpitations. GI:  Negative for constipation or diarrhea. : Negative for urinary frequency, dysuria, hematuria, nocturia. Skin: Negative for rash. Hematology: Negative for easy bruising, blood clots. Musculo-skelatal: Negative for back pain, muscle pain, weakness. Neurologic: Negative for headaches, dizziness, vertigo, memory problems not related to HE. Psychology: Negative for anxiety, depression. FAMILY HISTORY:  The father  of complications of open heart surgery. The mother Has/had the following chronic disease(s): CAD, Stroke. There is no family history of liver disease. SOCIAL HISTORY:  The patient is . The patient has 2 children, 4 step children and 15 grandchildren. The patient stopped using tobacco products in . The patient has previously consumed alcohol in excess. The patient retired in  as a nurse. PHYSICAL EXAMINATION:  Visit Vitals  Pulse 80   Temp 97.5 °F (36.4 °C)   Ht 5' 4\" (1.626 m)   Wt 206 lb (93.4 kg)   SpO2 98%   BMI 35.36 kg/m²     General: No acute distress. Eyes: Sclera anicteric. ENT: No oral lesions. Thyroid normal.  Nodes: No adenopathy. Skin: No spider angiomata. No jaundice. No palmar erythema. Respiratory: Lungs clear to auscultation. Cardiovascular: Regular heart rate. No murmurs. No JVD. Abdomen: Soft non-tender. Liver size normal to percussion/palpation. Spleen not palpable. No obvious ascites. Extremities: No edema. No muscle wasting. No gross arthritic changes. Neurologic: Alert and oriented. Cranial nerves grossly intact. No asterixis.     LABORATORY STUDIES:  Liver [place] of [address] Units 2023   WBC 4.6 - 13.2 K/uL 4.1 (L)   ANC 1.8 - 8.0 K/UL 2.2   HGB 12.0 - 16.0 g/dL 11.3 (L)    - 420 K/uL 198   INR 0.8 - 1.2   1.0   AST 10 - 38 U/L 75 (H)   ALT 13 - 56 U/L 76 (H)   Alk Phos 45 - 117 U/L 282 (H)   Bili, Total 0.2 - 1.0 MG/DL 0.4   Bili, Direct 0.0 - 0.2 MG/DL 0.1   Albumin 3.4 - 5.0 g/dL 3.8   BUN 7.0 - 18 MG/DL 25 (H)   Creat 0.6 - 1.3 MG/DL 0.94   Na 136 - 145 mmol/L 139   K 3.5 - 5.5 mmol/L 4.0   Cl 100 - 111 mmol/L 106   CO2 21 - 32 mmol/L 32   Glucose 74 - 99 mg/dL 141 (H)     SEROLOGIES:  Serologies Latest Ref Rng & Units 4/19/2021 3/17/2021   Hep A Ab, Total Negative    Negative   Hep B Surface Ag <1.00 Index  <0.10   Hep B Surface Ag Interp NEG    Negative   Hep B Core Ab, Total Negative    Negative   Hep B Surface Ab >10.0 mIU/mL  13.09   Hep B Surface Ab Interp POS    Positive   Hep C Ab 0.0 - 0.9 s/co ratio  <0.1   ANOOP, IFA  Negative    ASMCA 0 - 19 Units 13    M2 Ab 0.0 - 20.0 Units <20.0      LIVER HISTOLOGY:  4/2021. [facility]. EkPa was 4.2. IQR/med 22%. . The results suggested a fibrosis level of F0. The CAP score suggests there is hepatic steatosis. ENDOSCOPIC PROCEDURES:  Not available or performed    RADIOLOGY:  Not available or performed    OTHER TESTING:  10/2022. DEXA. Normal bone mineral density. FOLLOW-UP:  All of the issues listed above in the Assessment and Plan were discussed with the patient. All questions were answered. The patient expressed a clear understanding of the above.     Follow-up [facility] [address] in 3 months and Fibroscan      JUANIS Amador-BC  [address], [address]  [place], [address] [place] [address] Street [address]  [phone]

## 2023-05-08 ENCOUNTER — OFFICE VISIT (OUTPATIENT)
Age: 68
End: 2023-05-08
Payer: MEDICARE

## 2023-05-08 ENCOUNTER — HOSPITAL ENCOUNTER (OUTPATIENT)
Facility: HOSPITAL | Age: 68
Setting detail: SPECIMEN
Discharge: HOME | End: 2023-05-11
Payer: MEDICARE

## 2023-05-08 VITALS
OXYGEN SATURATION: 98 % | WEIGHT: 197 LBS | HEIGHT: 64 IN | DIASTOLIC BLOOD PRESSURE: 67 MMHG | BODY MASS INDEX: 33.63 KG/M2 | HEART RATE: 72 BPM | TEMPERATURE: 96.8 F | SYSTOLIC BLOOD PRESSURE: 136 MMHG

## 2023-05-08 DIAGNOSIS — R74.8 ELEVATED LIVER ENZYMES: Primary | ICD-10-CM

## 2023-05-08 DIAGNOSIS — R74.8 ELEVATED LIVER ENZYMES: ICD-10-CM

## 2023-05-08 LAB
ALBUMIN SERPL-MCNC: 3.9 G/DL (ref 3.4–5)
ALBUMIN/GLOB SERPL: 1.1 (ref 0.8–1.7)
ALP SERPL-CCNC: 237 U/L (ref 45–117)
ALT SERPL-CCNC: 49 U/L (ref 13–56)
ANION GAP SERPL CALC-SCNC: 5 MMOL/L (ref 3–18)
AST SERPL-CCNC: 58 U/L (ref 10–38)
BASOPHILS # BLD: 0 K/UL (ref 0–0.1)
BASOPHILS NFR BLD: 1 % (ref 0–2)
BILIRUB DIRECT SERPL-MCNC: 0.2 MG/DL (ref 0–0.2)
BILIRUB SERPL-MCNC: 0.6 MG/DL (ref 0.2–1)
BUN SERPL-MCNC: 19 MG/DL (ref 7–18)
BUN/CREAT SERPL: 24 (ref 12–20)
CALCIUM SERPL-MCNC: 9 MG/DL (ref 8.5–10.1)
CHLORIDE SERPL-SCNC: 102 MMOL/L (ref 100–111)
CO2 SERPL-SCNC: 29 MMOL/L (ref 21–32)
CREAT SERPL-MCNC: 0.8 MG/DL (ref 0.6–1.3)
DIFFERENTIAL METHOD BLD: ABNORMAL
EOSINOPHIL # BLD: 0.4 K/UL (ref 0–0.4)
EOSINOPHIL NFR BLD: 9 % (ref 0–5)
ERYTHROCYTE [DISTWIDTH] IN BLOOD BY AUTOMATED COUNT: 11.9 % (ref 11.6–14.5)
GLOBULIN SER CALC-MCNC: 3.5 G/DL (ref 2–4)
GLUCOSE SERPL-MCNC: 110 MG/DL (ref 74–99)
HCT VFR BLD AUTO: 39.3 % (ref 35–45)
HGB BLD-MCNC: 12.8 G/DL (ref 12–16)
IMM GRANULOCYTES # BLD AUTO: 0 K/UL (ref 0–0.04)
IMM GRANULOCYTES NFR BLD AUTO: 0 % (ref 0–0.5)
LYMPHOCYTES # BLD: 1 K/UL (ref 0.9–3.6)
LYMPHOCYTES NFR BLD: 23 % (ref 21–52)
MCH RBC QN AUTO: 34.1 PG (ref 24–34)
MCHC RBC AUTO-ENTMCNC: 32.6 G/DL (ref 31–37)
MCV RBC AUTO: 104.8 FL (ref 78–100)
MONOCYTES # BLD: 0.4 K/UL (ref 0.05–1.2)
MONOCYTES NFR BLD: 10 % (ref 3–10)
NEUTS SEG # BLD: 2.3 K/UL (ref 1.8–8)
NEUTS SEG NFR BLD: 56 % (ref 40–73)
NRBC # BLD: 0 K/UL (ref 0–0.01)
NRBC BLD-RTO: 0 PER 100 WBC
PLATELET # BLD AUTO: 178 K/UL (ref 135–420)
PMV BLD AUTO: 11.5 FL (ref 9.2–11.8)
POTASSIUM SERPL-SCNC: 4.4 MMOL/L (ref 3.5–5.5)
PROT SERPL-MCNC: 7.4 G/DL (ref 6.4–8.2)
RBC # BLD AUTO: 3.75 M/UL (ref 4.2–5.3)
SODIUM SERPL-SCNC: 136 MMOL/L (ref 136–145)
WBC # BLD AUTO: 4.1 K/UL (ref 4.6–13.2)

## 2023-05-08 PROCEDURE — 80076 HEPATIC FUNCTION PANEL: CPT

## 2023-05-08 PROCEDURE — 36415 COLL VENOUS BLD VENIPUNCTURE: CPT

## 2023-05-08 PROCEDURE — 1090F PRES/ABSN URINE INCON ASSESS: CPT | Performed by: NURSE PRACTITIONER

## 2023-05-08 PROCEDURE — 3074F SYST BP LT 130 MM HG: CPT | Performed by: NURSE PRACTITIONER

## 2023-05-08 PROCEDURE — 99213 OFFICE O/P EST LOW 20 MIN: CPT | Performed by: NURSE PRACTITIONER

## 2023-05-08 PROCEDURE — 1036F TOBACCO NON-USER: CPT | Performed by: NURSE PRACTITIONER

## 2023-05-08 PROCEDURE — 3017F COLORECTAL CA SCREEN DOC REV: CPT | Performed by: NURSE PRACTITIONER

## 2023-05-08 PROCEDURE — 3078F DIAST BP <80 MM HG: CPT | Performed by: NURSE PRACTITIONER

## 2023-05-08 PROCEDURE — G8400 PT W/DXA NO RESULTS DOC: HCPCS | Performed by: NURSE PRACTITIONER

## 2023-05-08 PROCEDURE — G8427 DOCREV CUR MEDS BY ELIG CLIN: HCPCS | Performed by: NURSE PRACTITIONER

## 2023-05-08 PROCEDURE — 85025 COMPLETE CBC W/AUTO DIFF WBC: CPT

## 2023-05-08 PROCEDURE — G8417 CALC BMI ABV UP PARAM F/U: HCPCS | Performed by: NURSE PRACTITIONER

## 2023-05-08 PROCEDURE — 80048 BASIC METABOLIC PNL TOTAL CA: CPT

## 2023-05-08 PROCEDURE — 1123F ACP DISCUSS/DSCN MKR DOCD: CPT | Performed by: NURSE PRACTITIONER

## 2023-05-08 ASSESSMENT — PATIENT HEALTH QUESTIONNAIRE - PHQ9
SUM OF ALL RESPONSES TO PHQ QUESTIONS 1-9: 0
2. FEELING DOWN, DEPRESSED OR HOPELESS: 0
SUM OF ALL RESPONSES TO PHQ9 QUESTIONS 1 & 2: 0
1. LITTLE INTEREST OR PLEASURE IN DOING THINGS: 0
SUM OF ALL RESPONSES TO PHQ QUESTIONS 1-9: 0

## 2023-05-08 NOTE — PROGRESS NOTES
medical treatments for WHITE are through clinical trials. The patient would be a good candidate for enrollment into a clinical trial if found to have WHITE. Counseling for diet and weight loss in patients with confirmed or suspected NAFLD  The patient was counseled regarding diet and exercise to achieve weight loss. The best diet for patients with fatty liver is one very low in carbohydrates and enriched with protein such as an [employer] program.      The patient was told not to consume any food products and drinks containing fructose as this enhances hepatic fat synthesis. There is no medication or vitamin supplements that we advocate for WHITE. Using glitazones in patients without diabetes mellitus has been shown to reduce fat content in the liver but has no effect on fibrosis and is associated with weight gain. Vitamin E has also been used but the data is not very good and most experts no longer advocate this. Screening for Hepatocellular Carcinoma  HCC screening is not necessary if the patient has no evidence of cirrhosis. Treatment of other medical problems in patients with chronic liver disease  There are no contraindications for the patient to take most medications that are necessary for treatment of other medical issues. The patient can take any medications utilized for treatment of DM, statins to treat hypercholesterolemia    The patient consumes alcohol on a daily basis or has recently stopped consuming alcohol. Regular alcohol use increases the risk of toxicity from acetaminophen. This analgesic should be avoided until the patient has been abstinent from alcohol for 6 months. Counseling for alcohol in patients with chronic liver disease  The patient was counseled regarding alcohol consumption and the effect of alcohol on chronic liver disease. Patients who have undergone obesity surgery are at much greater risk to develop alcoholic liver injury.     The patient consumes